# Patient Record
Sex: MALE | Race: WHITE | Employment: UNEMPLOYED | RURAL
[De-identification: names, ages, dates, MRNs, and addresses within clinical notes are randomized per-mention and may not be internally consistent; named-entity substitution may affect disease eponyms.]

---
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
## 2020-04-16 ENCOUNTER — APPOINTMENT (OUTPATIENT)
Dept: ULTRASOUND IMAGING | Facility: HOSPITAL | Age: 20
End: 2020-04-16
Payer: MEDICAID

## 2020-04-16 ENCOUNTER — APPOINTMENT (OUTPATIENT)
Dept: CT IMAGING | Facility: HOSPITAL | Age: 20
End: 2020-04-16
Payer: MEDICAID

## 2020-04-16 ENCOUNTER — HOSPITAL ENCOUNTER (EMERGENCY)
Facility: HOSPITAL | Age: 20
Discharge: HOME | End: 2020-04-16
Attending: EMERGENCY MEDICINE
Payer: MEDICAID

## 2020-04-16 VITALS
WEIGHT: 180 LBS | TEMPERATURE: 98.5 F | SYSTOLIC BLOOD PRESSURE: 124 MMHG | BODY MASS INDEX: 26.66 KG/M2 | HEIGHT: 69 IN | DIASTOLIC BLOOD PRESSURE: 87 MMHG | RESPIRATION RATE: 16 BRPM | OXYGEN SATURATION: 99 % | HEART RATE: 51 BPM

## 2020-04-16 LAB
A/G RATIO: 2.4 (ref 0.8–2)
ALBUMIN SERPL-MCNC: 5 G/DL (ref 3.4–4.8)
ALP BLD-CCNC: 67 U/L (ref 25–100)
ALT SERPL-CCNC: 19 U/L (ref 4–36)
ANION GAP SERPL CALCULATED.3IONS-SCNC: 12 MMOL/L (ref 3–16)
AST SERPL-CCNC: 25 U/L (ref 8–33)
BASOPHILS ABSOLUTE: 0.1 K/UL (ref 0–0.1)
BASOPHILS RELATIVE PERCENT: 0.8 %
BILIRUB SERPL-MCNC: 0.6 MG/DL (ref 0.3–1.2)
BILIRUBIN URINE: NEGATIVE
BLOOD, URINE: NEGATIVE
BUN BLDV-MCNC: 18 MG/DL (ref 6–20)
CALCIUM SERPL-MCNC: 9.8 MG/DL (ref 8.5–10.5)
CHLORIDE BLD-SCNC: 100 MMOL/L (ref 98–107)
CLARITY: CLEAR
CO2: 26 MMOL/L (ref 20–30)
COLOR: YELLOW
CREAT SERPL-MCNC: 1 MG/DL (ref 0.4–1.2)
EOSINOPHILS ABSOLUTE: 0.1 K/UL (ref 0–0.4)
EOSINOPHILS RELATIVE PERCENT: 1.8 %
GFR AFRICAN AMERICAN: >59
GFR NON-AFRICAN AMERICAN: >59
GLOBULIN: 2.1 G/DL
GLUCOSE BLD-MCNC: 83 MG/DL (ref 74–106)
GLUCOSE URINE: NEGATIVE MG/DL
HCT VFR BLD CALC: 48.3 % (ref 40–54)
HEMOGLOBIN: 16.4 G/DL (ref 13–18)
IMMATURE GRANULOCYTES #: 0 K/UL
IMMATURE GRANULOCYTES %: 0.5 % (ref 0–5)
KETONES, URINE: NEGATIVE MG/DL
LEUKOCYTE ESTERASE, URINE: NEGATIVE
LIPASE: 94 U/L (ref 5.6–51.3)
LYMPHOCYTES ABSOLUTE: 1.9 K/UL (ref 1.5–4)
LYMPHOCYTES RELATIVE PERCENT: 32.3 %
MCH RBC QN AUTO: 29.9 PG (ref 27–32)
MCHC RBC AUTO-ENTMCNC: 34 G/DL (ref 31–35)
MCV RBC AUTO: 88.1 FL (ref 80–100)
MICROSCOPIC EXAMINATION: NORMAL
MONOCYTES ABSOLUTE: 0.5 K/UL (ref 0.2–0.8)
MONOCYTES RELATIVE PERCENT: 8.2 %
NEUTROPHILS ABSOLUTE: 3.4 K/UL (ref 2–7.5)
NEUTROPHILS RELATIVE PERCENT: 56.4 %
NITRITE, URINE: NEGATIVE
PDW BLD-RTO: 12.4 % (ref 11–16)
PH UA: 5.5 (ref 5–8)
PLATELET # BLD: 218 K/UL (ref 150–400)
PMV BLD AUTO: 10.1 FL (ref 6–10)
POTASSIUM REFLEX MAGNESIUM: 4.2 MMOL/L (ref 3.4–5.1)
PROTEIN UA: NEGATIVE MG/DL
RBC # BLD: 5.48 M/UL (ref 4.5–6)
SODIUM BLD-SCNC: 138 MMOL/L (ref 136–145)
SPECIFIC GRAVITY UA: >=1.03 (ref 1–1.03)
TOTAL PROTEIN: 7.1 G/DL (ref 6.4–8.3)
URINE REFLEX TO CULTURE: NORMAL
URINE TYPE: NORMAL
UROBILINOGEN, URINE: 0.2 E.U./DL
WBC # BLD: 6 K/UL (ref 4–11)

## 2020-04-16 PROCEDURE — 36415 COLL VENOUS BLD VENIPUNCTURE: CPT

## 2020-04-16 PROCEDURE — 96374 THER/PROPH/DIAG INJ IV PUSH: CPT

## 2020-04-16 PROCEDURE — 96375 TX/PRO/DX INJ NEW DRUG ADDON: CPT

## 2020-04-16 PROCEDURE — 83690 ASSAY OF LIPASE: CPT

## 2020-04-16 PROCEDURE — 80053 COMPREHEN METABOLIC PANEL: CPT

## 2020-04-16 PROCEDURE — 81003 URINALYSIS AUTO W/O SCOPE: CPT

## 2020-04-16 PROCEDURE — 6360000002 HC RX W HCPCS: Performed by: EMERGENCY MEDICINE

## 2020-04-16 PROCEDURE — 76870 US EXAM SCROTUM: CPT

## 2020-04-16 PROCEDURE — 85025 COMPLETE CBC W/AUTO DIFF WBC: CPT

## 2020-04-16 PROCEDURE — 99284 EMERGENCY DEPT VISIT MOD MDM: CPT

## 2020-04-16 PROCEDURE — 74176 CT ABD & PELVIS W/O CONTRAST: CPT

## 2020-04-16 PROCEDURE — 2580000003 HC RX 258: Performed by: EMERGENCY MEDICINE

## 2020-04-16 RX ORDER — ONDANSETRON 2 MG/ML
4 INJECTION INTRAMUSCULAR; INTRAVENOUS ONCE
Status: COMPLETED | OUTPATIENT
Start: 2020-04-16 | End: 2020-04-16

## 2020-04-16 RX ORDER — 0.9 % SODIUM CHLORIDE 0.9 %
1000 INTRAVENOUS SOLUTION INTRAVENOUS ONCE
Status: COMPLETED | OUTPATIENT
Start: 2020-04-16 | End: 2020-04-16

## 2020-04-16 RX ORDER — IBUPROFEN 800 MG/1
800 TABLET ORAL EVERY 6 HOURS PRN
Qty: 20 TABLET | Refills: 0 | Status: SHIPPED | OUTPATIENT
Start: 2020-04-16

## 2020-04-16 RX ORDER — KETOROLAC TROMETHAMINE 30 MG/ML
30 INJECTION, SOLUTION INTRAMUSCULAR; INTRAVENOUS ONCE
Status: COMPLETED | OUTPATIENT
Start: 2020-04-16 | End: 2020-04-16

## 2020-04-16 RX ADMIN — ONDANSETRON 4 MG: 2 INJECTION INTRAMUSCULAR; INTRAVENOUS at 06:41

## 2020-04-16 RX ADMIN — KETOROLAC TROMETHAMINE 30 MG: 30 INJECTION, SOLUTION INTRAMUSCULAR at 06:41

## 2020-04-16 RX ADMIN — SODIUM CHLORIDE 1000 ML: 9 INJECTION, SOLUTION INTRAVENOUS at 06:41

## 2020-04-16 ASSESSMENT — PAIN DESCRIPTION - ORIENTATION: ORIENTATION: LEFT;RIGHT

## 2020-04-16 ASSESSMENT — PAIN SCALES - GENERAL
PAINLEVEL_OUTOF10: 3
PAINLEVEL_OUTOF10: 7
PAINLEVEL_OUTOF10: 7

## 2020-04-16 ASSESSMENT — PAIN DESCRIPTION - PROGRESSION: CLINICAL_PROGRESSION: GRADUALLY WORSENING

## 2020-04-16 ASSESSMENT — PAIN DESCRIPTION - FREQUENCY: FREQUENCY: CONTINUOUS

## 2020-04-16 ASSESSMENT — PAIN DESCRIPTION - LOCATION: LOCATION: BACK;GROIN

## 2020-04-16 ASSESSMENT — PAIN DESCRIPTION - DESCRIPTORS: DESCRIPTORS: SHARP;STABBING

## 2020-04-16 ASSESSMENT — PAIN DESCRIPTION - PAIN TYPE: TYPE: ACUTE PAIN

## 2020-04-16 NOTE — ED PROVIDER NOTES
[facility] ENCOUNTER      Pt Name: Rj Rico  MRN: [id number]  YOB: 2000  Date of evaluation: 4/16/2020  Provider: Pat Jenkins MD    [address] [place] [address]       Chief Complaint   Patient presents with    Abdominal Pain     Onset of symptoms yesterday afternoon    Groin Pain         HISTORY OF PRESENT ILLNESS  (Location/Symptom, Timing/Onset, Context/Setting, Quality, Duration, Modifying Factors, Severity.)   Rj Rico is a 21 y.o. male who presents to the emergency department 1 day history of intermittent right flank pain radiating to his testicles. He states that he has some mild left flank pain but it is more pronounced on the right-hand side. He states that the pain causes him to have nausea. He denies any past medical history of kidney stones. He denies any injury. He denies any dysuria frequency or urgency. He denies any heavy lifting or straining. The patient states that his symptoms continue to progress through the night so he presented to the emergency department for further evaluation      Nursing notes were reviewed. REVIEW OFSYSTEMS    (2-9 systems for level 4, 10 or more for level 5)   ROS:  General:  No fevers, no chills, no weakness  Cardiovascular:  No chest pain, no palpitations  Respiratory:  No shortness of breath, no cough, no wheezing  Gastrointestinal: Right greater than left flank pain associated with nausea  Musculoskeletal:  No muscle pain, no joint pain  Skin:  No rash, no easy bruising  Neurologic:  No speech problems, no headache, no extremity weakness  Psychiatric:  No anxiety  Genitourinary:  No dysuria, no hematuria. States the pain radiates to his testicular region    Except as noted above the remainder of the review of systems was reviewed and negative. PAST MEDICAL HISTORY   History reviewed. No pertinent past medical history.       SURGICAL HISTORY       Past Surgical History: MEDICATIONS ADMINISTERED IN ED:  Medications   0.9 % sodium chloride bolus (1,000 mLs Intravenous New Bag 4/16/20 0641)   ondansetron (ZOFRAN) injection 4 mg (4 mg Intravenous Given 4/16/20 0641)   ketorolac (TORADOL) injection 30 mg (30 mg Intravenous Given 4/16/20 0641)       Patient was placed in examination room and after exam was performed IV was obtained and labs were drawn. Patient was given 1 L IV normal saline bolus along with 30 mg of Toradol IV and 4 mg of Zofran IV and patient was taken over for CT scan abdomen pelvis without contrast.  Patient's care was turned over to Dr. Jess Juárez at 0700 hrs.    0700 hrs. 4/16/2020  I have signed out [facility] Emergency Department care to Dr. Jess Juárez. We discussed the pertinent history, physical exam, completed/pending test results (if applicable) and current treatment plan. Please refer to his/her chart for the patients remaining Emergency Department course and final disposition. The patient will follow-up with their PCP in 1-2 days for reevaluation. If the patient or family members have anyfurther concerns or any worsening symptoms they will return to the ED for reevaluation. CONSULTS:  None    PROCEDURES:  Procedures    CRITICAL CARE TIME    Total Critical Care time was 0 minutes, excluding separately reportable procedures. There was a high probability of clinically significant/life threatening deterioration in the patient's condition which required my urgent intervention. FINAL IMPRESSION      1. Flank pain, acute    2. Nausea          DISPOSITION/PLAN   DISPOSITION        PATIENT REFERRED TO:  No follow-up provider specified.     DISCHARGE MEDICATIONS:  New Prescriptions    No medications on file       Comment: Please note this report has been produced using speech recognition software and may contain errorsrelated to that system including errors in grammar, punctuation, and spelling, as well as words and phrases that may be inappropriate. If there are any questions or concerns please feel free to contact the dictating providerfor clarification.     Lydia Patel MD  Attending Emergency Physician              Lydia Patel MD  04/16/20 5725

## 2020-04-16 NOTE — ED PROVIDER NOTES
suggest cause of patient's right flank pain it specifically, no evidence of renal collecting system stone. 2. 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 Us Scrotum And Testicles    Result Date: 4/16/2020  SCROTAL ULTRASOUND HISTORY: Bilateral testicular pain COMPARISON STUDY: None. FINDINGS: Multiple sonographic images obtained through the bilateral scrotum. Right testicle appears normal in size and echotexture measuring 2.5 x 3.4 x 4.6 cm. Uniform echotexture throughout the right testicle. No intratesticular mass seen. Left testicle measures 2.9 x 3.0 x 4.7 cm demonstrating uniform echotexture. No intratesticular mass identified. Color flow and gray scale Doppler imaging of the testes demonstrate normal vascular flow. Very small simple hydroceles identified bilaterally. No varicocele seen. Bilateral epididymides appear normal in size. Small 4 x 6 mm right epididymal cyst.  Left abdomen is unremarkable. 1.  Unremarkable sonographic appearance of the bilateral testes with normal vascular flow. 2.  Small right epididymal cyst measuring 4 x 6 mm. 3.  Small simple hydroceles bilaterally. Final ED Course and MDM: In brief, Jennifer Sigrid is a 21 y.o. male whose care was signed out to me by the outgoing provider. In brief, patient presented to the emergency department for evaluation of bilateral flank pain right greater than left that radiates down into the groin area. .  Apparently the symptoms started yesterday afternoon and progressively worsened throughout the evening and night patient presented here for evaluation. He did have nausea but denied any vomiting. Denied any pulling tugging lifting type injury. States now that he got the pain medication he can barely feel the discomfort but the area is  to touch.     Blood work showed sodium 139, potassium 4.0, chlorides 101, CO2 is 28, glucose of

## 2020-05-07 ENCOUNTER — OFFICE VISIT (OUTPATIENT)
Dept: UROLOGY | Facility: CLINIC | Age: 20
End: 2020-05-07

## 2020-05-07 VITALS
HEART RATE: 72 BPM | TEMPERATURE: 97.4 F | HEIGHT: 69 IN | WEIGHT: 180 LBS | OXYGEN SATURATION: 98 % | BODY MASS INDEX: 26.66 KG/M2 | RESPIRATION RATE: 18 BRPM

## 2020-05-07 DIAGNOSIS — N50.82 SCROTAL PAIN: Primary | ICD-10-CM

## 2020-05-07 LAB
BILIRUB BLD-MCNC: ABNORMAL MG/DL
CLARITY, POC: CLEAR
COLOR UR: YELLOW
GLUCOSE UR STRIP-MCNC: NEGATIVE MG/DL
KETONES UR QL: ABNORMAL
LEUKOCYTE EST, POC: NEGATIVE
NITRITE UR-MCNC: NEGATIVE MG/ML
PH UR: 6 [PH] (ref 5–8)
PROT UR STRIP-MCNC: ABNORMAL MG/DL
RBC # UR STRIP: NEGATIVE /UL
SP GR UR: 1.03 (ref 1–1.03)
UROBILINOGEN UR QL: NORMAL

## 2020-05-07 PROCEDURE — 99203 OFFICE O/P NEW LOW 30 MIN: CPT | Performed by: UROLOGY

## 2020-05-07 NOTE — PROGRESS NOTES
Chief Complaint  Right groin/testicular pain    Referring Provider  Sherry Montes APRN HPI  Mr. Rooney is a 20 y.o. male who presented to [facility] on April 16, with several days of right groin/testicular pain.  The pain spontaneously resolved 1 week later.    Quality:    Sharp but sometimes dull  Provocative factors:  Activity makes it worse  Palliative factors:   Rest makes it better  Radiation:   Behind the testicle up into the back  Severity:   0/10 currently and 8/10 at its worst  Previous treatments: None of them pain medication    No current LUTS  No history of vasectomy  No history of kidney stones, recent trauma or injury.  No history of constipation    He does have interest in future fertility.    Scrotal ultrasound and CT abdomen pelvis were performed and were both unremarkable.     Past Medical History  No past medical history on file.    Past Surgical History  No past surgical history on file.    Medications  No current outpatient medications on file.    Allergies  Allergies   Allergen Reactions   • Cephalexin Hives   • Sulfa Antibiotics Hives       Social History  Social History     Socioeconomic History   • Marital status: Single     Spouse name: Not on file   • Number of children: Not on file   • Years of education: Not on file   • Highest education level: Not on file   Tobacco Use   • Smoking status: Current Every Day Smoker     Packs/day: 0.50     Years: 5.00     Pack years: 2.50     Types: Cigarettes   • Smokeless tobacco: Never Used       Family History  He has no family history of kidney stones    Review of Systems  Constitutional: No fevers or chills  Skin: Negative for rash  Endocrine: No heat/cold intolerance   Cardiovascular: Negative for chest pain or dyspnea on exertion  Respiratory: Negative for shortness of breath or wheezing  Gastrointestinal: No nausea or vomiting  Genitourinary: Negative for new lower urinary tract symptoms, current gross hematuria or  "dysuria.  Musculoskeletal: No flank pain  Neurological:  Negative for frequent headaches or dizziness  Lymph/Heme: Negative for leg swelling or calf pain.    Physical Exam  Visit Vitals  Pulse 72   Temp 97.4 °F (36.3 °C)   Resp 18   Ht 175.3 cm (69\")   Wt 81.6 kg (180 lb)   SpO2 98%   BMI 26.58 kg/m²     Constitutional: NAD, WDWN.   HEENT: NCAT. Conjunctivae normal.  MMM.    Cardiovascular: Regular rate.  Pulmonary/Chest: Respirations are even and non-labored bilaterally.  Abdominal: Soft. No distension, tenderness, masses or guarding. No CVA tenderness.  Neurological: A + O x 3.  Cranial Nerves II-XII grossly intact. Normal gait.  Extremities: KAREN x 4, Warm. No clubbing.  No cyanosis.    Skin: Pink, warm and dry.  No rashes noted.  Psychiatric:  Normal mood and affect    Genitourinary  Penis: circumcised penis, glans normal, no penile discharge.  No rashes/lesions.    Testes: descended bilaterally, no masses, LEFT epididymis nontender to palpation,  RIGHT epididymis nontender to palpation. No varicocele palpated. No hernia appreciated.      Labs  Brief Urine Lab Results  (Last result in the past 365 days)      Color   Clarity   Blood   Leuk Est   Nitrite   Protein   CREAT   Urine HCG        05/07/20 0937 Yellow Clear Negative Negative Negative Trace               No results found for: GLUCOSE, CALCIUM, NA, K, CO2, CL, BUN, CREATININE, EGFRIFAFRI, EGFRIFNONA, BCR, ANIONGAP    Radiologic Studies  No Images in the past 120 days found..      Assessment  Mr. Rooney is a 20 y.o. male who presents with recent idiopathic acute right testicular pain.    We discussed  different strategies for management including conservative therapy, pain medications (gabapentin, nortriptyline) and surgical interventions (cord denervation).    Plan  1.  Scrotal  with increased activity or during a flare  2.  Warm bath soaks twice daily.  3.  NSAIDs for pain as instructed with food.  4.  Follow-up as needed    No follow-ups on " file.    Archie Antonio MD

## 2022-08-02 ENCOUNTER — HOSPITAL ENCOUNTER (OUTPATIENT)
Facility: HOSPITAL | Age: 22
Discharge: HOME | End: 2022-08-02
Payer: MEDICAID

## 2022-08-02 ENCOUNTER — HOSPITAL ENCOUNTER (OUTPATIENT)
Dept: GENERAL RADIOLOGY | Facility: HOSPITAL | Age: 22
Discharge: HOME | End: 2022-08-02
Payer: MEDICAID

## 2022-08-02 DIAGNOSIS — M25.531 RIGHT WRIST PAIN: ICD-10-CM

## 2022-08-02 PROCEDURE — 73110 X-RAY EXAM OF WRIST: CPT

## 2024-08-22 ENCOUNTER — HOSPITAL ENCOUNTER (OUTPATIENT)
Facility: HOSPITAL | Age: 24
Discharge: HOME | End: 2024-08-22
Payer: MEDICAID

## 2024-08-22 LAB — SARS-COV-2 RDRP RESP QL NAA+PROBE: DETECTED

## 2024-08-22 PROCEDURE — 87635 SARS-COV-2 COVID-19 AMP PRB: CPT

## 2025-03-08 ENCOUNTER — HOSPITAL ENCOUNTER (EMERGENCY)
Facility: HOSPITAL | Age: 25
Discharge: HOME | End: 2025-03-08
Attending: HOSPITALIST
Payer: MEDICAID

## 2025-03-08 VITALS
HEART RATE: 99 BPM | SYSTOLIC BLOOD PRESSURE: 147 MMHG | HEIGHT: 66 IN | RESPIRATION RATE: 18 BRPM | OXYGEN SATURATION: 98 % | TEMPERATURE: 97.7 F | WEIGHT: 160 LBS | DIASTOLIC BLOOD PRESSURE: 103 MMHG | BODY MASS INDEX: 25.71 KG/M2

## 2025-03-08 DIAGNOSIS — S09.93XA LIP INJURY, INITIAL ENCOUNTER: Primary | ICD-10-CM

## 2025-03-08 DIAGNOSIS — S01.511A LIP LACERATION, INITIAL ENCOUNTER: ICD-10-CM

## 2025-03-08 PROCEDURE — 6360000002 HC RX W HCPCS: Performed by: HOSPITALIST

## 2025-03-08 PROCEDURE — 90715 TDAP VACCINE 7 YRS/> IM: CPT | Performed by: HOSPITALIST

## 2025-03-08 PROCEDURE — 99283 EMERGENCY DEPT VISIT LOW MDM: CPT

## 2025-03-08 PROCEDURE — 12011 RPR F/E/E/N/L/M 2.5 CM/<: CPT

## 2025-03-08 PROCEDURE — 90471 IMMUNIZATION ADMIN: CPT | Performed by: HOSPITALIST

## 2025-03-08 PROCEDURE — 6370000000 HC RX 637 (ALT 250 FOR IP): Performed by: HOSPITALIST

## 2025-03-08 RX ORDER — LIDOCAINE HYDROCHLORIDE 10 MG/ML
5 INJECTION, SOLUTION INFILTRATION; PERINEURAL ONCE
Status: COMPLETED | OUTPATIENT
Start: 2025-03-08 | End: 2025-03-08

## 2025-03-08 RX ADMIN — AMOXICILLIN AND CLAVULANATE POTASSIUM 1 TABLET: 875; 125 TABLET, FILM COATED ORAL at 19:32

## 2025-03-08 RX ADMIN — TETANUS TOXOID, REDUCED DIPHTHERIA TOXOID AND ACELLULAR PERTUSSIS VACCINE, ADSORBED 0.5 ML: 5; 2.5; 8; 8; 2.5 SUSPENSION INTRAMUSCULAR at 19:32

## 2025-03-08 RX ADMIN — LIDOCAINE HYDROCHLORIDE 5 ML: 10 INJECTION, SOLUTION INFILTRATION; PERINEURAL at 19:10

## 2025-03-08 ASSESSMENT — LIFESTYLE VARIABLES
HOW OFTEN DO YOU HAVE A DRINK CONTAINING ALCOHOL: NEVER
HOW MANY STANDARD DRINKS CONTAINING ALCOHOL DO YOU HAVE ON A TYPICAL DAY: PATIENT DOES NOT DRINK
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
